# Patient Record
Sex: FEMALE | URBAN - METROPOLITAN AREA
[De-identification: names, ages, dates, MRNs, and addresses within clinical notes are randomized per-mention and may not be internally consistent; named-entity substitution may affect disease eponyms.]

---

## 2018-03-14 NOTE — PATIENT DISCUSSION
slight symptoms systemic cause. Poss CRP and Sed Rate if no improvement.  But, for her age all arteritis symtoms would be very unusual.

## 2018-03-14 NOTE — PROCEDURE NOTE: CLINICAL
PROCEDURE NOTE: Punctal Plugs, Silicone #2 Bilateral Lower Lids. Diagnosis: KYLAH. Prior to treatment, the risks/benefits/alternatives were discussed. The patient wished to proceed with procedure. Permanent silicone plugs were inserted. Patient tolerated procedure well. There were no complications. Post procedure instructions given. Size *. 0.6mm.

## 2019-03-12 ENCOUNTER — IMPORTED ENCOUNTER (OUTPATIENT)
Dept: URBAN - METROPOLITAN AREA CLINIC 1 | Facility: CLINIC | Age: 79
End: 2019-03-12

## 2019-03-12 PROBLEM — H25.813: Noted: 2019-03-12

## 2019-03-12 PROBLEM — H43.812: Noted: 2019-03-12

## 2019-03-12 PROBLEM — D31.32: Noted: 2019-03-12

## 2019-03-12 PROBLEM — H10.45: Noted: 2019-03-12

## 2019-03-12 PROBLEM — H40.023: Noted: 2019-03-12

## 2019-03-12 PROBLEM — H04.123: Noted: 2019-03-12

## 2019-03-12 PROCEDURE — 92250 FUNDUS PHOTOGRAPHY W/I&R: CPT

## 2019-03-12 PROCEDURE — 92004 COMPRE OPH EXAM NEW PT 1/>: CPT

## 2019-03-12 PROCEDURE — 92015 DETERMINE REFRACTIVE STATE: CPT

## 2019-03-12 NOTE — PATIENT DISCUSSION
1.  Allergic Conjunctivitis OU -- The condition was discussed with the patient. Avoidance of allergens and cool compresses were recommended. Recommend the use of OTC Zaditor BID OU PRN Itching. 2.  Dry Eyes OU -- Recommend increase the frequent use of OTC PF AT's TID OU Routinely (Sample of Blink PF AT's Given). 3.  Choroidal Nevus OS -- Appears Benign. Observe for changes & Refer to Dr. Dion Escalera for second opinion. 4.  Glaucoma Suspect OU (CD: 0.75 OU) -- IOP 18 OU today. Negative Family h/o Glaucoma. Disc photos ordered & taken today & photos showed disc cupping OU. Patient advised to return for baseline OCT / HVF 24-2 OU. Patient is considered high risk. Condition was discussed with patient and patient understands. Will continue to monitor patient for any progression in condition. Patient was advised to call us with any problems questions or concerns. 5.  Cataracts OU -- Observe for now without intervention. The patient was advised to contact us if any change or worsening of vision. 6. 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 Finalized Glasses MRx was given to patient today. Return for an appointment in 4 MO for a 10 / Lenell Human / 24-2 HVF OU with Dr. Abi Alvarado. Refer to Dr. Dion Escalera for second opinion of Choroidal Nevus OS within the next month.

## 2019-06-24 NOTE — PROCEDURE NOTE: CLINICAL
PROCEDURE NOTE: Punctal Plugs, Silicone #2 Bilateral Lower Lids. Diagnosis: KYLAH. Anesthesia: Topical. Prep: Antibiotic Drops q 5min x 3. Prior to treatment, the risks/benefits/alternatives were discussed. The patient wished to proceed with procedure. Permanent silicone plugs were inserted. Patient tolerated procedure well. There were no complications. Post procedure instructions given. Size *. 0.6mm.

## 2019-06-24 NOTE — PATIENT DISCUSSION
Poss cause is dry eye. Plugs should help. No other symptoms. No cell in eye. No sign of angle issue.

## 2019-08-06 ENCOUNTER — IMPORTED ENCOUNTER (OUTPATIENT)
Dept: URBAN - METROPOLITAN AREA CLINIC 1 | Facility: CLINIC | Age: 79
End: 2019-08-06

## 2019-08-06 PROBLEM — H10.45: Noted: 2019-08-06

## 2019-08-06 PROBLEM — H25.813: Noted: 2019-08-06

## 2019-08-06 PROBLEM — H40.023: Noted: 2019-08-06

## 2019-08-06 PROBLEM — H04.123: Noted: 2019-08-06

## 2019-08-06 PROCEDURE — 92083 EXTENDED VISUAL FIELD XM: CPT

## 2019-08-06 PROCEDURE — 92012 INTRM OPH EXAM EST PATIENT: CPT

## 2019-08-06 PROCEDURE — 92133 CPTRZD OPH DX IMG PST SGM ON: CPT

## 2019-08-06 NOTE — PATIENT DISCUSSION
1.  Glaucoma Suspect OU (CD: 0.75 OU) -- IOP stable today OU. Negative Family h/o Glaucoma. OCT today shows WNL OU. 24-2 HVF today shows WNL OD and non-specific loss OS. Patient is considered high risk. Condition was discussed with patient and patient understands. Will continue to monitor patient for any progression in condition. Patient was advised to call us with any problems questions or concerns. 2.  Dry Eyes OU -- Recommend increase the frequent use of OTC AT's BID-QID OU Routinely. 3.  Allergic Conjunctivitis OU -- The condition was discussed with the patient. Avoidance of allergens and cool compresses were recommended. Recommend the use of OTC Zaditor BID OU PRN Itching. 4.  Cataracts OU -- Observe for now without intervention. The patient was advised to contact us if any change or worsening of vision. 5. H/o Choroidal Nevus OS 6. H/o PVD w/o Tear OSReturn for an appointment in March 2020 for a 27 / Johnney Najjar with Dr. Lobito Albarran.

## 2020-03-10 ENCOUNTER — IMPORTED ENCOUNTER (OUTPATIENT)
Dept: URBAN - METROPOLITAN AREA CLINIC 1 | Facility: CLINIC | Age: 80
End: 2020-03-10

## 2020-03-10 PROBLEM — H04.123: Noted: 2020-03-10

## 2020-03-10 PROBLEM — H43.812: Noted: 2020-03-10

## 2020-03-10 PROBLEM — H40.013: Noted: 2020-03-10

## 2020-03-10 PROBLEM — H10.45: Noted: 2020-03-10

## 2020-03-10 PROBLEM — H25.813: Noted: 2020-03-10

## 2020-03-10 PROBLEM — D31.32: Noted: 2020-03-10

## 2020-03-10 PROCEDURE — 92014 COMPRE OPH EXAM EST PT 1/>: CPT

## 2020-03-10 NOTE — PATIENT DISCUSSION
1.  Cataract OU: Observe for now without intervention. The patient was advised to contact us if any change or worsening of vision2. Dry Eyes OU - Recommend increase PF ATs QID OU routinely (sample PF Refresh Kiel 3 given). Consider plugs without improvement. H/o Restasis use no longer taking. 3.  Choroidal Nevus OS: Appears Benign and stable. Observe for changes. 4. Glaucoma Suspect OU (CD 0.75 OU): Past w/u negative. IOP stable. Negative family hx. Patient is considered Low Risk. Condition was discussed with patient and patient understands. Will continue to monitor patient for any progression in condition. Patient was advised to call us with any problems questions or concerns. 5.  Allergic Conjunctivitis OU - The condition was  discussed with the patient. Avoidance of allergens and cool compresses were recommended. OTC Zaditor BID OU PRN for itching6. PVD w/o Tear OS- RD precautions. Patient deferred Manifest Rx today. Return for an appointment in 6 months 10/DFE/glare with Dr. Rachna Davila.

## 2020-09-14 ENCOUNTER — IMPORTED ENCOUNTER (OUTPATIENT)
Dept: URBAN - METROPOLITAN AREA CLINIC 1 | Facility: CLINIC | Age: 80
End: 2020-09-14

## 2020-09-14 PROBLEM — H25.813: Noted: 2020-09-14

## 2020-09-14 PROBLEM — H04.123: Noted: 2020-09-14

## 2020-09-14 PROBLEM — D31.32: Noted: 2020-09-14

## 2020-09-14 PROBLEM — H10.45: Noted: 2020-09-14

## 2020-09-14 PROBLEM — H40.013: Noted: 2020-09-14

## 2020-09-14 PROCEDURE — 92012 INTRM OPH EXAM EST PATIENT: CPT

## 2020-09-14 NOTE — PATIENT DISCUSSION
1.  Cataract OU: Observe for now without intervention. The patient was advised to contact us if any change or worsening of vision2. Allergic Conjunctivitis OU -- The condition was  discussed with the patient. Avoidance of allergens and cool compresses were recommended. OTC Zaditor/Patanol BID OU PRN for itching. Handout given today. 3.  Dry Eyes OU -- Continue PF ATs QID OU routinely (sample PF Refresh Kiel 3 given). Consider plugs without improvement. H/o Restasis use no longer taking. 4.  Glaucoma Suspect OU (CD 0.80.75): Past w/u negative. IOP stable. Negative family hx. Patient is considered Low Risk. Condition was discussed with patient and patient understands. Will continue to monitor patient for any progression in condition. Patient was advised to call us with any problems questions or concerns. 5.  Choroidal Nevus OS -- Appears Benign and stable. Observe for changes. 6. PVD w/o Tear OS- RD precautions. Return for an appointment in 6 months 30/OCT/glare with Dr. Pipe Vernon.

## 2021-03-16 ENCOUNTER — IMPORTED ENCOUNTER (OUTPATIENT)
Dept: URBAN - METROPOLITAN AREA CLINIC 1 | Facility: CLINIC | Age: 81
End: 2021-03-16

## 2021-03-16 PROBLEM — H40.013: Noted: 2021-03-16

## 2021-03-16 PROBLEM — H16.143: Noted: 2021-03-16

## 2021-03-16 PROBLEM — H04.123: Noted: 2021-03-16

## 2021-03-16 PROBLEM — H25.813: Noted: 2021-03-16

## 2021-03-16 PROCEDURE — 92015 DETERMINE REFRACTIVE STATE: CPT

## 2021-03-16 PROCEDURE — 99214 OFFICE O/P EST MOD 30 MIN: CPT

## 2021-03-16 PROCEDURE — 92133 CPTRZD OPH DX IMG PST SGM ON: CPT

## 2021-03-16 NOTE — PATIENT DISCUSSION
1.  Glaucoma Suspect OU (CD 0.80/0.75): OCT WNL OU. IOP stable. Negative family hx. Patient is considered Low Risk. Condition was discussed with patient and patient understands. Will continue to monitor patient for any progression in condition. Patient was advised to call us with any problems questions or concerns. 2.  Cataract OU:  Visually Significant secondary to glare discussed the risks benefits alternatives and limitations of cataract surgery. The patient stated a full understanding and a desire to proceed with the procedure. The patient will need to return for preop appointment with cataract measurements and to have any additional questions answered and start pre-operative eye drops as directed. *Likely Toric for distance Phaco PCL Os then OD Otherwise follow-up 6 months DFE/glare 3. Dry Eyes OU - Recommend ATs TID OU routinely 4. Allergic Conjunctivitis OU -  OTC Zaditor/Patanol BID OU PRN for itching. 5.  Choroidal Nevus OS - Appears Benign and stable. Observe for changes. 6. PVD w/o Tear OS- RD precautions. Return for an appointment for Ascan/H and P. with Dr. Hansel Guido.

## 2021-04-09 ENCOUNTER — IMPORTED ENCOUNTER (OUTPATIENT)
Dept: URBAN - METROPOLITAN AREA CLINIC 1 | Facility: CLINIC | Age: 81
End: 2021-04-09

## 2021-04-09 PROBLEM — H25.812: Noted: 2021-04-09

## 2021-04-09 PROCEDURE — 92136 OPHTHALMIC BIOMETRY: CPT

## 2021-04-14 ENCOUNTER — IMPORTED ENCOUNTER (OUTPATIENT)
Dept: URBAN - METROPOLITAN AREA CLINIC 1 | Facility: CLINIC | Age: 81
End: 2021-04-14

## 2021-04-15 ENCOUNTER — IMPORTED ENCOUNTER (OUTPATIENT)
Dept: URBAN - METROPOLITAN AREA CLINIC 1 | Facility: CLINIC | Age: 81
End: 2021-04-15

## 2021-04-15 PROBLEM — Z96.1: Noted: 2021-04-15

## 2021-04-15 PROCEDURE — 99024 POSTOP FOLLOW-UP VISIT: CPT

## 2021-04-15 NOTE — PATIENT DISCUSSION
POD#1 CE/IOL Vivity Toric OS doing well. [place] in place. Use Lotemax BID OS Prolensa Qdaily OS Ocuflox TID OS: Use all three gtts through completion of PO gtt chart regimen/ Per our instructions given to patient.   Post op Warnings Reiterated RTC as scheduled

## 2021-04-20 ENCOUNTER — IMPORTED ENCOUNTER (OUTPATIENT)
Dept: URBAN - METROPOLITAN AREA CLINIC 1 | Facility: CLINIC | Age: 81
End: 2021-04-20

## 2021-04-20 PROBLEM — H25.811: Noted: 2021-04-20

## 2021-04-20 PROCEDURE — 92136 OPHTHALMIC BIOMETRY: CPT

## 2021-04-20 NOTE — PATIENT DISCUSSION
1.  Cataract OD: Visually Significant secondary to glare discussed the risks benefits alternatives and limitations of cataract surgery. The patient stated a full understanding and a desire to proceed with the procedure. Discussed with patient if PO Gtts are more than $120 for all three combined when filling at their Pharmacy please call our office to request generic substitutions. Discussed with patient and patient understands glare may be expected post-op particularly at night with the MF lens choice. There may be a need for OTC readers for near work. Pt understood. Phaco PCL OD 2. POW#3  CE/IOL OS (Vivity Toric MF) doing well. *Dextenza in place  Use Lotemax BID OS and Prolensa Qdaily OS: Use gtts through completion of PO gtt regimen.  F/u as scheduled 2nd eye

## 2021-04-28 ENCOUNTER — IMPORTED ENCOUNTER (OUTPATIENT)
Dept: URBAN - METROPOLITAN AREA CLINIC 1 | Facility: CLINIC | Age: 81
End: 2021-04-28

## 2021-04-29 ENCOUNTER — IMPORTED ENCOUNTER (OUTPATIENT)
Dept: URBAN - METROPOLITAN AREA CLINIC 1 | Facility: CLINIC | Age: 81
End: 2021-04-29

## 2021-04-29 PROBLEM — Z96.1: Noted: 2021-04-29

## 2021-04-29 PROCEDURE — 99024 POSTOP FOLLOW-UP VISIT: CPT

## 2021-04-29 NOTE — PATIENT DISCUSSION
1. POD#1 Phaco/ PCL Vivity Toric MF OD- doing well. (Dextenza) Use Lotemax BID OD Prolensa Qdaily OD Ocuflox TID OD Use all three gtts through completion of PO gtt chart regimen/ Per our instructions given. Post op Warnings Reiterated 2. POW#3 Phaco/ PCL Vivity Toric MF OS- doing well. (Dextenza) Use Lotemax BID OS && Prolensa Qdaily OS: Use through completion of po gtt regimen.  RTC as scheduled

## 2021-05-18 ENCOUNTER — IMPORTED ENCOUNTER (OUTPATIENT)
Dept: URBAN - METROPOLITAN AREA CLINIC 1 | Facility: CLINIC | Age: 81
End: 2021-05-18

## 2021-05-18 PROBLEM — Z96.1: Noted: 2021-05-18

## 2021-05-18 PROCEDURE — 99024 POSTOP FOLLOW-UP VISIT: CPT

## 2021-05-18 NOTE — PATIENT DISCUSSION
POM#1 CE/IOL OU (Vivity Toric MF OU) doing well. *Dextenza in place  Use Lotemax BID OD and Prolensa Qdaily OD: Use gtts through completion of PO gtt regimen. Return for an appointment in March 30 with Dr. Suzette Leyva.

## 2021-12-02 ENCOUNTER — IMPORTED ENCOUNTER (OUTPATIENT)
Dept: URBAN - METROPOLITAN AREA CLINIC 1 | Facility: CLINIC | Age: 81
End: 2021-12-02

## 2021-12-02 PROBLEM — D31.32: Noted: 2021-12-02

## 2021-12-02 PROBLEM — H16.143: Noted: 2021-12-02

## 2021-12-02 PROBLEM — Z96.1: Noted: 2021-12-02

## 2021-12-02 PROBLEM — H04.123: Noted: 2021-12-02

## 2021-12-02 PROBLEM — H40.013: Noted: 2021-12-02

## 2021-12-02 PROBLEM — D22.12: Noted: 2021-12-02

## 2021-12-02 PROCEDURE — 99214 OFFICE O/P EST MOD 30 MIN: CPT

## 2021-12-02 NOTE — PATIENT DISCUSSION
1.  QING w/ PEK OU -- Recommend the use of OTC ATs TID OU.  2.  Choroidal Nevus OS -- Appears Benign and stable. Observe for changes. 3. PVD w/o Tear OS -- Old Stable. RD precautions. 4.  Pseudophakia OU -- Vivity MF w/ LenSx OU. Doing well. 5.  Lesion lower lid benign OS -- Benign. Will cont to observe. 6.  Glaucoma Suspect OU -- (CD 0.80.75) IOP stable. Past work up negative. Patient is considered Low Risk. Return for an appointment in 1 year for a 30/OCT with Dr. Matteo Aguila.

## 2022-04-02 ASSESSMENT — VISUAL ACUITY
OS_GLARE: 20/80
OD_CC: 20/100
OD_SC: 20/50+2
OD_CC: J2
OD_GLARE: 20/80
OS_SC: J1
OS_SC: 20/20-1
OS_SC: J1
OS_GLARE: 20/30
OS_SC: 20/20
OD_SC: 20/25
OD_GLARE: 20/60
OD_CC: 20/20-2
OD_GLARE: 20/25
OS_GLARE: 20/60
OS_GLARE: 20/80
OS_SC: 20/20-1
OS_SC: J1
OS_CC: 20/20-2
OD_SC: J1
OD_SC: 20/25-1
OS_CC: 20/20
OD_CC: 20/25
OS_CC: 20/20
OS_CC: 20/70
OS_CC: J1
OD_SC: J2
OD_SC: 20/25
OD_CC: J1
OS_CC: 20/60
OS_CC: J1
OD_CC: 20/25
OD_GLARE: 20/100
OS_CC: 20/20
OD_GLARE: 20/100
OS_GLARE: 20/80
OS_SC: 20/25-1
OD_SC: 20/25-1
OS_SC: 20/25
OS_CC: 20/20
OD_CC: 20/100
OU_SC: J1

## 2022-04-02 ASSESSMENT — TONOMETRY
OS_IOP_MMHG: 18
OS_IOP_MMHG: 18
OD_IOP_MMHG: 18
OD_IOP_MMHG: 19
OS_IOP_MMHG: 18
OD_IOP_MMHG: 16
OD_IOP_MMHG: 16
OS_IOP_MMHG: 14
OS_IOP_MMHG: 15
OS_IOP_MMHG: 18
OD_IOP_MMHG: 18
OS_IOP_MMHG: 17
OD_IOP_MMHG: 20
OS_IOP_MMHG: 18
OS_IOP_MMHG: 16
OD_IOP_MMHG: 19
OD_IOP_MMHG: 18
OS_IOP_MMHG: 18
OD_IOP_MMHG: 18
OS_IOP_MMHG: 18

## 2022-06-21 NOTE — PATIENT DISCUSSION
1. Cataract OS:  Visually Significant secondary to glare discussed the risks benefits alternatives and limitations of cataract surgery. The patient stated a full understanding and a desire to proceed with the procedure. Discussed with patient if PO Gtts are more than $120 for all three combined when filling at their Pharmacy please call our office to request generic substitutions. Pt came to pre-op appointment today with her . Lifestyle Questionnaire Completed. Phaco PCL OSReturn for an appointment for Return as scheduled with Dr. Anthony Hughes. Anesthesia Volume In Cc: 0.5 Medical Necessity Information: It is in your best interest to select a reason for this procedure from the list below. All of these items fulfill various CMS LCD requirements except the new and changing color options. Consent: The patient's consent was obtained including but not limited to risks of crusting, scabbing, blistering, scarring, darker or lighter pigmentary change, recurrence, incomplete removal and infection. Medical Necessity Clause: This procedure was medically necessary because the lesions that were treated were: Render Note In Bullet Format When Appropriate: No Treatment Number (Will Not Render If 0): 0 Detail Level: Detailed Post-Care Instructions: I reviewed with the patient in detail post-care instructions. Patient is to wear sunprotection, and avoid picking at any of the treated lesions. Pt may apply Vaseline to crusted or scabbing areas.

## 2023-05-25 ENCOUNTER — COMPREHENSIVE EXAM (OUTPATIENT)
Dept: URBAN - METROPOLITAN AREA CLINIC 1 | Facility: CLINIC | Age: 83
End: 2023-05-25

## 2023-05-25 DIAGNOSIS — H43.813: ICD-10-CM

## 2023-05-25 DIAGNOSIS — D31.32: ICD-10-CM

## 2023-05-25 DIAGNOSIS — H16.143: ICD-10-CM

## 2023-05-25 DIAGNOSIS — H40.013: ICD-10-CM

## 2023-05-25 DIAGNOSIS — D23.122: ICD-10-CM

## 2023-05-25 DIAGNOSIS — H04.123: ICD-10-CM

## 2023-05-25 PROCEDURE — 99214 OFFICE O/P EST MOD 30 MIN: CPT

## 2023-05-25 RX ORDER — OLOPATADINE HYDROCHLORIDE 2 MG/ML
1 SOLUTION OPHTHALMIC ONCE A DAY
Start: 2023-05-25

## 2023-05-25 ASSESSMENT — TONOMETRY
OD_IOP_MMHG: 17
OS_IOP_MMHG: 16

## 2023-05-25 ASSESSMENT — VISUAL ACUITY
OD_SC: 20/20-2
OD_SC: J2
OS_SC: 20/20-1
OS_SC: J2

## 2023-11-30 ENCOUNTER — FOLLOW UP (OUTPATIENT)
Dept: URBAN - METROPOLITAN AREA CLINIC 1 | Facility: CLINIC | Age: 83
End: 2023-11-30

## 2023-11-30 DIAGNOSIS — H04.123: ICD-10-CM

## 2023-11-30 DIAGNOSIS — H40.013: ICD-10-CM

## 2023-11-30 PROCEDURE — 99213 OFFICE O/P EST LOW 20 MIN: CPT

## 2023-11-30 PROCEDURE — 92133 CPTRZD OPH DX IMG PST SGM ON: CPT

## 2023-11-30 ASSESSMENT — VISUAL ACUITY
OS_SC: J1
OD_SC: J2
OD_SC: 20/25-2
OS_SC: 20/20-2

## 2023-11-30 ASSESSMENT — TONOMETRY
OD_IOP_MMHG: 17
OS_IOP_MMHG: 16

## 2024-04-10 ENCOUNTER — COMPREHENSIVE EXAM (OUTPATIENT)
Dept: URBAN - METROPOLITAN AREA CLINIC 2 | Facility: CLINIC | Age: 84
End: 2024-04-10

## 2024-04-10 DIAGNOSIS — D31.32: ICD-10-CM

## 2024-04-10 DIAGNOSIS — D23.122: ICD-10-CM

## 2024-04-10 DIAGNOSIS — H16.143: ICD-10-CM

## 2024-04-10 DIAGNOSIS — H40.013: ICD-10-CM

## 2024-04-10 DIAGNOSIS — H04.123: ICD-10-CM

## 2024-04-10 DIAGNOSIS — H43.813: ICD-10-CM

## 2024-04-10 PROCEDURE — 99214 OFFICE O/P EST MOD 30 MIN: CPT

## 2024-04-10 ASSESSMENT — TONOMETRY
OS_IOP_MMHG: 17
OD_IOP_MMHG: 17

## 2024-04-10 ASSESSMENT — VISUAL ACUITY
OD_SC: 20/25-2
OS_SC: 20/20-2

## 2025-04-11 ENCOUNTER — COMPREHENSIVE EXAM (OUTPATIENT)
Age: 85
End: 2025-04-11

## 2025-04-11 DIAGNOSIS — Q14.1: ICD-10-CM

## 2025-04-11 DIAGNOSIS — H35.371: ICD-10-CM

## 2025-04-11 DIAGNOSIS — H43.813: ICD-10-CM

## 2025-04-11 DIAGNOSIS — H40.013: ICD-10-CM

## 2025-04-11 DIAGNOSIS — H04.123: ICD-10-CM

## 2025-04-11 DIAGNOSIS — H16.143: ICD-10-CM

## 2025-04-11 PROCEDURE — 99214 OFFICE O/P EST MOD 30 MIN: CPT

## 2025-08-22 ENCOUNTER — EMERGENCY VISIT (OUTPATIENT)
Age: 85
End: 2025-08-22

## 2025-08-22 DIAGNOSIS — H10.13: ICD-10-CM

## 2025-08-22 DIAGNOSIS — H16.143: ICD-10-CM

## 2025-08-22 DIAGNOSIS — H04.123: ICD-10-CM

## 2025-08-22 PROCEDURE — 99213 OFFICE O/P EST LOW 20 MIN: CPT
